# Patient Record
Sex: FEMALE | Race: OTHER | HISPANIC OR LATINO | URBAN - METROPOLITAN AREA
[De-identification: names, ages, dates, MRNs, and addresses within clinical notes are randomized per-mention and may not be internally consistent; named-entity substitution may affect disease eponyms.]

---

## 2017-05-15 ENCOUNTER — INPATIENT (INPATIENT)
Facility: HOSPITAL | Age: 62
LOS: 2 days | Discharge: HOME | DRG: 125 | End: 2017-05-18
Attending: PSYCHIATRY & NEUROLOGY | Admitting: PSYCHIATRY & NEUROLOGY
Payer: SELF-PAY

## 2017-05-15 VITALS
OXYGEN SATURATION: 96 % | DIASTOLIC BLOOD PRESSURE: 77 MMHG | HEART RATE: 71 BPM | WEIGHT: 195.55 LBS | TEMPERATURE: 98 F | RESPIRATION RATE: 18 BRPM | SYSTOLIC BLOOD PRESSURE: 197 MMHG

## 2017-05-15 LAB
ALBUMIN SERPL ELPH-MCNC: 3.6 G/DL — SIGNIFICANT CHANGE UP (ref 3.4–5)
ALP SERPL-CCNC: 103 U/L — SIGNIFICANT CHANGE UP (ref 40–120)
ALT FLD-CCNC: 28 U/L — SIGNIFICANT CHANGE UP (ref 12–42)
ANION GAP SERPL CALC-SCNC: 6 MMOL/L — LOW (ref 9–16)
APTT BLD: 32.4 SEC — SIGNIFICANT CHANGE UP (ref 27.5–37.4)
AST SERPL-CCNC: 46 U/L — HIGH (ref 15–37)
BASOPHILS NFR BLD AUTO: 0.5 % — SIGNIFICANT CHANGE UP (ref 0–2)
BILIRUB SERPL-MCNC: 0.7 MG/DL — SIGNIFICANT CHANGE UP (ref 0.2–1.2)
BUN SERPL-MCNC: 6 MG/DL — LOW (ref 7–23)
CALCIUM SERPL-MCNC: 8.8 MG/DL — SIGNIFICANT CHANGE UP (ref 8.5–10.5)
CHLORIDE SERPL-SCNC: 104 MMOL/L — SIGNIFICANT CHANGE UP (ref 96–108)
CK MB CFR SERPL CALC: <1 NG/ML — SIGNIFICANT CHANGE UP (ref 0.5–3.6)
CK SERPL-CCNC: 114 U/L — SIGNIFICANT CHANGE UP (ref 26–192)
CO2 SERPL-SCNC: 29 MMOL/L — SIGNIFICANT CHANGE UP (ref 22–31)
CREAT SERPL-MCNC: 0.78 MG/DL — SIGNIFICANT CHANGE UP (ref 0.5–1.3)
EOSINOPHIL NFR BLD AUTO: 2.8 % — SIGNIFICANT CHANGE UP (ref 0–6)
GLUCOSE SERPL-MCNC: 119 MG/DL — HIGH (ref 70–99)
HCT VFR BLD CALC: 40.5 % — SIGNIFICANT CHANGE UP (ref 34.5–45)
HGB BLD-MCNC: 13.8 G/DL — SIGNIFICANT CHANGE UP (ref 11.5–15.5)
INR BLD: 1.11 — SIGNIFICANT CHANGE UP (ref 0.88–1.16)
LYMPHOCYTES # BLD AUTO: 29.6 % — SIGNIFICANT CHANGE UP (ref 13–44)
MCHC RBC-ENTMCNC: 29.7 PG — SIGNIFICANT CHANGE UP (ref 27–34)
MCHC RBC-ENTMCNC: 34.1 G/DL — SIGNIFICANT CHANGE UP (ref 32–36)
MCV RBC AUTO: 87.1 FL — SIGNIFICANT CHANGE UP (ref 80–100)
MONOCYTES NFR BLD AUTO: 8.7 % — SIGNIFICANT CHANGE UP (ref 2–14)
NEUTROPHILS NFR BLD AUTO: 58.4 % — SIGNIFICANT CHANGE UP (ref 43–77)
PLATELET # BLD AUTO: 271 K/UL — SIGNIFICANT CHANGE UP (ref 150–400)
POTASSIUM SERPL-MCNC: 3.8 MMOL/L — SIGNIFICANT CHANGE UP (ref 3.5–5.3)
POTASSIUM SERPL-SCNC: 3.8 MMOL/L — SIGNIFICANT CHANGE UP (ref 3.5–5.3)
PROT SERPL-MCNC: 7.9 G/DL — SIGNIFICANT CHANGE UP (ref 6.4–8.2)
PROTHROM AB SERPL-ACNC: 12.3 SEC — SIGNIFICANT CHANGE UP (ref 9.8–12.7)
RBC # BLD: 4.65 M/UL — SIGNIFICANT CHANGE UP (ref 3.8–5.2)
RBC # FLD: 12.7 % — SIGNIFICANT CHANGE UP (ref 10.3–16.9)
SODIUM SERPL-SCNC: 139 MMOL/L — SIGNIFICANT CHANGE UP (ref 135–145)
TROPONIN I SERPL-MCNC: <0.015 NG/ML — SIGNIFICANT CHANGE UP (ref 0.01–0.04)
WBC # BLD: 5.6 K/UL — SIGNIFICANT CHANGE UP (ref 3.8–10.5)
WBC # FLD AUTO: 5.6 K/UL — SIGNIFICANT CHANGE UP (ref 3.8–10.5)

## 2017-05-15 PROCEDURE — 70496 CT ANGIOGRAPHY HEAD: CPT | Mod: 26

## 2017-05-15 PROCEDURE — 99285 EMERGENCY DEPT VISIT HI MDM: CPT | Mod: 25

## 2017-05-15 PROCEDURE — 93010 ELECTROCARDIOGRAM REPORT: CPT

## 2017-05-15 PROCEDURE — 70498 CT ANGIOGRAPHY NECK: CPT | Mod: 26

## 2017-05-15 RX ORDER — HEPARIN SODIUM 5000 [USP'U]/ML
5000 INJECTION INTRAVENOUS; SUBCUTANEOUS EVERY 8 HOURS
Qty: 0 | Refills: 0 | Status: DISCONTINUED | OUTPATIENT
Start: 2017-05-15 | End: 2017-05-18

## 2017-05-15 RX ORDER — ASPIRIN/CALCIUM CARB/MAGNESIUM 324 MG
81 TABLET ORAL DAILY
Qty: 0 | Refills: 0 | Status: DISCONTINUED | OUTPATIENT
Start: 2017-05-16 | End: 2017-05-18

## 2017-05-15 RX ORDER — ASPIRIN/CALCIUM CARB/MAGNESIUM 324 MG
81 TABLET ORAL DAILY
Qty: 0 | Refills: 0 | Status: DISCONTINUED | OUTPATIENT
Start: 2017-05-15 | End: 2017-05-15

## 2017-05-15 RX ORDER — CLOPIDOGREL BISULFATE 75 MG/1
75 TABLET, FILM COATED ORAL DAILY
Qty: 0 | Refills: 0 | Status: DISCONTINUED | OUTPATIENT
Start: 2017-05-15 | End: 2017-05-18

## 2017-05-15 RX ORDER — SODIUM CHLORIDE 9 MG/ML
1000 INJECTION INTRAMUSCULAR; INTRAVENOUS; SUBCUTANEOUS
Qty: 0 | Refills: 0 | Status: DISCONTINUED | OUTPATIENT
Start: 2017-05-15 | End: 2017-05-16

## 2017-05-15 RX ORDER — MECLIZINE HCL 12.5 MG
50 TABLET ORAL EVERY 12 HOURS
Qty: 0 | Refills: 0 | Status: DISCONTINUED | OUTPATIENT
Start: 2017-05-15 | End: 2017-05-17

## 2017-05-15 RX ORDER — ATORVASTATIN CALCIUM 80 MG/1
80 TABLET, FILM COATED ORAL AT BEDTIME
Qty: 0 | Refills: 0 | Status: DISCONTINUED | OUTPATIENT
Start: 2017-05-15 | End: 2017-05-18

## 2017-05-15 RX ORDER — ACETAMINOPHEN 500 MG
650 TABLET ORAL EVERY 6 HOURS
Qty: 0 | Refills: 0 | Status: DISCONTINUED | OUTPATIENT
Start: 2017-05-15 | End: 2017-05-18

## 2017-05-15 RX ORDER — ASPIRIN/CALCIUM CARB/MAGNESIUM 324 MG
81 TABLET ORAL ONCE
Qty: 0 | Refills: 0 | Status: COMPLETED | OUTPATIENT
Start: 2017-05-15 | End: 2017-05-15

## 2017-05-15 RX ORDER — ASPIRIN/CALCIUM CARB/MAGNESIUM 324 MG
81 TABLET ORAL ONCE
Qty: 0 | Refills: 0 | Status: DISCONTINUED | OUTPATIENT
Start: 2017-05-15 | End: 2017-05-15

## 2017-05-15 RX ADMIN — CLOPIDOGREL BISULFATE 75 MILLIGRAM(S): 75 TABLET, FILM COATED ORAL at 18:57

## 2017-05-15 RX ADMIN — ATORVASTATIN CALCIUM 80 MILLIGRAM(S): 80 TABLET, FILM COATED ORAL at 21:43

## 2017-05-15 RX ADMIN — Medication 81 MILLIGRAM(S): at 14:40

## 2017-05-15 RX ADMIN — HEPARIN SODIUM 5000 UNIT(S): 5000 INJECTION INTRAVENOUS; SUBCUTANEOUS at 21:43

## 2017-05-15 NOTE — ED PROVIDER NOTE - OBJECTIVE STATEMENT
62 yo F poorly compliant pre-DM, HTN, dyslipidemia not on medications p/w 3 days of constant dizziness described as imbalance, with intermittent vision changes and hand tingling and headaches.  Denies neck pain, chest pain, sob.

## 2017-05-15 NOTE — ED PROVIDER NOTE - NEUROLOGICAL, MLM
Alert and oriented, CN2-12 intact, nl speech, slightly off balanced gait, nl finger to nose and KYM, 5/5 bl upper and lower strength, nl sensory exam.

## 2017-05-15 NOTE — ED ADULT NURSE NOTE - CHPI ED SYMPTOMS NEG
no numbness/no vomiting/no fever/no nausea/no weakness/no loss of consciousness/no confusion/no change in level of consciousness

## 2017-05-15 NOTE — H&P ADULT - NSHPREVIEWOFSYSTEMS_GEN_ALL_CORE
General - no f/c  Heart - stable dyspnea with exertion (2 blocks), chronic lower extremity swelling, no CP, no orthopnea  Lungs - no cough, no wheezing  GI - no n/v/d/constipation

## 2017-05-15 NOTE — H&P ADULT - ATTENDING COMMENTS
Patient seen with team.   CT from [facility] reviewed last night - patient with left PCA stroke that is chronic and questionable ischemia in the medulla. The right vertebral also appears markedly stenotic. Vertigo is not positional and with h/o stroke and with risk factors agreee with admission and work up including MRI Brain. Work up should include dissection work up.

## 2017-05-15 NOTE — H&P ADULT - HISTORY OF PRESENT ILLNESS
60 yo W, full iADLs no medical follow up h/o bilat DVT s/p IVC filter (4 years ago) presents with dizziness x 3 days. The dizziness feels like the room is spinning. It had an acute onset, waxes and wanes, and was accompanied by blurry vision, a posterior HA and tingling in her hands. No weakness. No n/v, no difficulty speaking.     In ED pt had high 's/90's improving to 140's/60's. CTH showed encephalomalacia changes within the left occipital lobe which may be secondary to prior PCA territory infarct. CTA showed attenuated caliber to the left posterior cerebral artery correlating with the left PCA territory infarct. Given aspirni 81 and admitted to stroke unit. 60 yo W, full iADLs no medical follow up h/o bilat DVT s/p IVC filter (4 years ago) presents with dizziness x 3 days. The dizziness feels like the room is spinning. It had an acute onset, waxes and wanes, and was accompanied by blurry vision, a posterior HA and bilateral tingling in her hands. No weakness. No n/v, no difficulty speaking, no vision loss.      In ED pt had high 's/90's improving to 140's/60's. CTH showed encephalomalacia changes within the left occipital lobe which may be secondary to prior PCA territory infarct. CTA showed attenuated caliber to the left posterior cerebral artery correlating with the left PCA territory infarct. Given aspirni 81 and admitted to stroke unit.

## 2017-05-15 NOTE — ED PROVIDER NOTE - MEDICAL DECISION MAKING DETAILS
Pt with ss noted above.  Concern for cerebellar stroke as cause - noted CT.  Likely related to noncompliance of chronic med conditions.  Plan admit to stroke service for further hugo.  No acute interv indicated.

## 2017-05-15 NOTE — ED ADULT TRIAGE NOTE - CHIEF COMPLAINT QUOTE
c/o dizziness x 3 days accompanies with neck pain. Pt reported having vision changes yesterday described as seeing pitch black to left eye that lasted for 7 minutes. No facial droop or slurring of speech noted. BUE are strong a equal in strength.

## 2017-05-15 NOTE — H&P ADULT - NSHPPHYSICALEXAM_GEN_ALL_CORE
T(F): 98.2, Max: 98.2 (05-15 @ 15:46)  HR: 73 (54 - 73)  BP: 162/78 (142/68 - 197/77)  RR: 16 (16 - 18), SpO2: 97% (95% - 98%) RA    General - NAD, no dysarthria  HEENT - PERRL, EOMI, no nystagmus, MMM  Neck - supple  Heart - RRR, frequent PVCs, no murmurs  Lungs - CTAB  Abdomen - soft, NTND  Extremities - WWP, trace edema  MSK - no joint swelling  Neuro - CN grossly intact and symmetric, 5/5 strength in all extremities, sensation to soft touch intact and symmetric, no dysmetria. T(F): 98.2, Max: 98.2 (05-15 @ 15:46)  HR: 73 (54 - 73)  BP: 162/78 (142/68 - 197/77)  RR: 16 (16 - 18), SpO2: 97% (95% - 98%) RA    General - NAD, no dysarthria  HEENT - PERRL, EOMI, no gross visual field defect, no nystagmus, MMM  Neck - supple  Heart - RRR, frequent PVCs, no murmurs  Lungs - CTAB  Abdomen - soft, NTND  Extremities - WWP, trace edema  MSK - no joint swelling  Neuro - CN grossly intact and symmetric, 5/5 strength in all extremities, sensation to soft touch intact and symmetric, no dysmetria.

## 2017-05-15 NOTE — ED ADULT NURSE NOTE - OBJECTIVE STATEMENT
Pt with hx of MT and IVC filter began having lightheadedness and blurred vision in her L eye 3 days ago. Pt states she has no other symptoms, but she had these symptoms before last time she was admitted to the hosp about 4 yrs ago. Pt has not had follow up care since that time.

## 2017-05-15 NOTE — H&P ADULT - ASSESSMENT
IMPRESSION: 62 yo W, full iADLs no medical follow up h/o bilat DVT s/p IVC filter (4 years ago) presents with dizziness x 3 days. CT findings concerning for stroke.     PLAN  #dizziness - concerning for CVA but imaging does not correlate with symptom type and onset. ordered for MRI brain. also ordered for full stroke etiology w/u - echo bubble, TSH, A1C, lipids, EKG. will treat with aspirin 81 and high dose statin. IVF. permissive HTN < 200/100 as symptoms started within 3 days. Consulted PT, OT.     #High blood pressure - unclear if underlying HTN or 2/2 stroke. will monitor on tele unit.     #Diet - DASH    #PPX - SQH    FULL CODE

## 2017-05-15 NOTE — H&P ADULT - NSHPLABSRESULTS_GEN_ALL_CORE
13.8   5.6   )-----------( 271      ( 15 May 2017 11:05 )             40.5   05-15    139  |  104  |  6<L>  ----------------------------<  119<H>  3.8   |  29  |  0.78    Ca    8.8      15 May 2017 11:05    TPro  7.9  /  Alb  3.6  /  TBili  0.7  /  DBili  x   /  AST  46<H>  /  ALT  28  /  AlkPhos  103  05-15    CARDIAC MARKERS ( 15 May 2017 11:05 )  <0.015 ng/mL / x     / 114 U/L / x     / <1.0 ng/mL

## 2017-05-16 LAB
ANION GAP SERPL CALC-SCNC: 7 MMOL/L — LOW (ref 9–16)
BUN SERPL-MCNC: 7 MG/DL — SIGNIFICANT CHANGE UP (ref 7–23)
CALCIUM SERPL-MCNC: 8.2 MG/DL — LOW (ref 8.5–10.5)
CHLORIDE SERPL-SCNC: 104 MMOL/L — SIGNIFICANT CHANGE UP (ref 96–108)
CHOLEST SERPL-MCNC: 179 MG/DL — SIGNIFICANT CHANGE UP
CO2 SERPL-SCNC: 28 MMOL/L — SIGNIFICANT CHANGE UP (ref 22–31)
CREAT SERPL-MCNC: 0.68 MG/DL — SIGNIFICANT CHANGE UP (ref 0.5–1.3)
GLUCOSE SERPL-MCNC: 110 MG/DL — HIGH (ref 70–99)
HBA1C BLD-MCNC: 6.4 % — HIGH (ref 4.8–5.6)
HCT VFR BLD CALC: 37.6 % — SIGNIFICANT CHANGE UP (ref 34.5–45)
HDLC SERPL-MCNC: 32 MG/DL — LOW
HGB BLD-MCNC: 12.7 G/DL — SIGNIFICANT CHANGE UP (ref 11.5–15.5)
LIPID PNL WITH DIRECT LDL SERPL: 99 MG/DL — SIGNIFICANT CHANGE UP
MAGNESIUM SERPL-MCNC: 2.2 MG/DL — SIGNIFICANT CHANGE UP (ref 1.6–2.6)
MCHC RBC-ENTMCNC: 29.7 PG — SIGNIFICANT CHANGE UP (ref 27–34)
MCHC RBC-ENTMCNC: 33.8 G/DL — SIGNIFICANT CHANGE UP (ref 32–36)
MCV RBC AUTO: 87.9 FL — SIGNIFICANT CHANGE UP (ref 80–100)
PLATELET # BLD AUTO: 252 K/UL — SIGNIFICANT CHANGE UP (ref 150–400)
POTASSIUM SERPL-MCNC: 3.5 MMOL/L — SIGNIFICANT CHANGE UP (ref 3.5–5.3)
POTASSIUM SERPL-SCNC: 3.5 MMOL/L — SIGNIFICANT CHANGE UP (ref 3.5–5.3)
RBC # BLD: 4.28 M/UL — SIGNIFICANT CHANGE UP (ref 3.8–5.2)
RBC # FLD: 12.8 % — SIGNIFICANT CHANGE UP (ref 10.3–16.9)
SODIUM SERPL-SCNC: 139 MMOL/L — SIGNIFICANT CHANGE UP (ref 135–145)
TOTAL CHOLESTEROL/HDL RATIO MEASUREMENT: 5.6 RATIO — HIGH
TRIGL SERPL-MCNC: 238 MG/DL — HIGH
TSH SERPL-MCNC: 1.97 UIU/ML — SIGNIFICANT CHANGE UP (ref 0.35–4.94)
WBC # BLD: 5.7 K/UL — SIGNIFICANT CHANGE UP (ref 3.8–10.5)
WBC # FLD AUTO: 5.7 K/UL — SIGNIFICANT CHANGE UP (ref 3.8–10.5)

## 2017-05-16 PROCEDURE — 93010 ELECTROCARDIOGRAM REPORT: CPT

## 2017-05-16 PROCEDURE — 70551 MRI BRAIN STEM W/O DYE: CPT | Mod: 26

## 2017-05-16 RX ORDER — LISINOPRIL 2.5 MG/1
5 TABLET ORAL DAILY
Qty: 0 | Refills: 0 | Status: DISCONTINUED | OUTPATIENT
Start: 2017-05-16 | End: 2017-05-16

## 2017-05-16 RX ORDER — LISINOPRIL 2.5 MG/1
5 TABLET ORAL DAILY
Qty: 0 | Refills: 0 | Status: DISCONTINUED | OUTPATIENT
Start: 2017-05-16 | End: 2017-05-18

## 2017-05-16 RX ORDER — POTASSIUM CHLORIDE 20 MEQ
40 PACKET (EA) ORAL EVERY 4 HOURS
Qty: 0 | Refills: 0 | Status: COMPLETED | OUTPATIENT
Start: 2017-05-16 | End: 2017-05-16

## 2017-05-16 RX ORDER — INSULIN LISPRO 100/ML
VIAL (ML) SUBCUTANEOUS
Qty: 0 | Refills: 0 | Status: DISCONTINUED | OUTPATIENT
Start: 2017-05-16 | End: 2017-05-18

## 2017-05-16 RX ORDER — POTASSIUM CHLORIDE 20 MEQ
40 PACKET (EA) ORAL
Qty: 0 | Refills: 0 | Status: DISCONTINUED | OUTPATIENT
Start: 2017-05-16 | End: 2017-05-16

## 2017-05-16 RX ORDER — SODIUM CHLORIDE 9 MG/ML
1000 INJECTION INTRAMUSCULAR; INTRAVENOUS; SUBCUTANEOUS
Qty: 0 | Refills: 0 | Status: DISCONTINUED | OUTPATIENT
Start: 2017-05-16 | End: 2017-05-17

## 2017-05-16 RX ADMIN — HEPARIN SODIUM 5000 UNIT(S): 5000 INJECTION INTRAVENOUS; SUBCUTANEOUS at 06:05

## 2017-05-16 RX ADMIN — ATORVASTATIN CALCIUM 80 MILLIGRAM(S): 80 TABLET, FILM COATED ORAL at 21:35

## 2017-05-16 RX ADMIN — Medication 81 MILLIGRAM(S): at 11:54

## 2017-05-16 RX ADMIN — Medication 40 MILLIEQUIVALENT(S): at 15:09

## 2017-05-16 RX ADMIN — Medication 40 MILLIEQUIVALENT(S): at 10:39

## 2017-05-16 RX ADMIN — HEPARIN SODIUM 5000 UNIT(S): 5000 INJECTION INTRAVENOUS; SUBCUTANEOUS at 15:10

## 2017-05-16 RX ADMIN — CLOPIDOGREL BISULFATE 75 MILLIGRAM(S): 75 TABLET, FILM COATED ORAL at 11:54

## 2017-05-16 RX ADMIN — HEPARIN SODIUM 5000 UNIT(S): 5000 INJECTION INTRAVENOUS; SUBCUTANEOUS at 21:35

## 2017-05-16 NOTE — PROGRESS NOTE ADULT - SUBJECTIVE AND OBJECTIVE BOX
INTERVAL HPI/OVERNIGHT EVENTS: BUDDY o/n    VITAL SIGNS:  T(F): 97.6, Max: 98.6 (05-15 @ 18:46)  HR: 53 (53 - 73)  BP: 162/76 (142/68 - 197/77)  BP(mean): 109 (98 - 113)  RR: 16 (16 - 18), SpO2: 96% (95% - 98%)    PHYSICAL EXAM:  Constitutional:  Eyes:  ENMT:  Neck:  Respiratory:  Cardiovascular:  Gastrointestinal:  Extremities:  Vascular:  Neurological:  Musculoskeletal:    LABS:                        12.7   5.7   )-----------( 252      ( 16 May 2017 05:56 )             37.6     05-16    139  |  104  |  7   ----------------------------<  110<H>  3.5   |  28  |  0.68    Ca    8.2<L>      16 May 2017 05:56  Mg     2.2     05-16    RADIOLOGY & ADDITIONAL TESTS: INTERVAL HPI/OVERNIGHT EVENTS: BUDDY o/n  SUBJECTIVE: dizziness improved, no complaints    VITAL SIGNS:  T(F): 97.6, Max: 98.6 (05-15 @ 18:46)  HR: 53 (53 - 73)  BP: 162/76 (142/68 - 197/77)  BP(mean): 109 (98 - 113)  RR: 16 (16 - 18), SpO2: 96% (95% - 98%) RA    PHYSICAL EXAM:  Constitutional: NAD  Eyes: PERRL, EOMI, no nystagmus  ENMT: MMM  Neck: supple  Respiratory: CTAB  Cardiovascular: RRR, no murmurs  Gastrointestinal: obese, soft, NTND  Extremities: WWP, trace edema  Vascular: +radial, DPs  Neurological: A&O x 3, no focal visual field deficits, 5/5 strength, sensation intact and symmetric to soft touch  Musculoskeletal: No joint swelling    LABS:                        12.7   5.7   )-----------( 252      ( 16 May 2017 05:56 )             37.6     05-16    139  |  104  |  7   ----------------------------<  110<H>  3.5   |  28  |  0.68    Ca    8.2<L>      16 May 2017 05:56  Mg     2.2     05-16    RADIOLOGY & ADDITIONAL TESTS: EKG - sinus christiano INTERVAL HPI/OVERNIGHT EVENTS: BUDDY o/n  SUBJECTIVE: dizziness improved. On further hx, patient had sudden reduced Va x 1 wk.     VITAL SIGNS:  T(F): 97.6, Max: 98.6 (05-15 @ 18:46)  HR: 53 (53 - 73)  BP: 162/76 (142/68 - 197/77)  BP(mean): 109 (98 - 113)  RR: 16 (16 - 18), SpO2: 96% (95% - 98%) RA    PHYSICAL EXAM:  Constitutional: NAD  Eyes: PERRL, EOMI, no nystagmus  ENMT: MMM  Neck: supple  Respiratory: CTAB  Cardiovascular: RRR, no murmurs  Gastrointestinal: obese, soft, NTND  Extremities: WWP, trace edema  Vascular: +radial, DPs  Neurological: A&O x 3, OS CF at 1ft, 5/5 strength, sensation intact and symmetric to soft touch  Musculoskeletal: No joint swelling    LABS:                        12.7   5.7   )-----------( 252      ( 16 May 2017 05:56 )             37.6     05-16    139  |  104  |  7   ----------------------------<  110<H>  3.5   |  28  |  0.68    Ca    8.2<L>      16 May 2017 05:56  Mg     2.2     05-16    RADIOLOGY & ADDITIONAL TESTS: EKG - sinus christiano

## 2017-05-16 NOTE — CONSULT NOTE ADULT - CONSULT REASON
61 year old female admitted for evaluation of vertigo for past four days.  Denies medical problems and is on no meds per patient.    Patient notes sudden painless loss of vision OS one week ago without improvement.  No flashing lights, floaters or curtains in vision.  Has not improved.

## 2017-05-16 NOTE — OCCUPATIONAL THERAPY INITIAL EVALUATION ADULT - GENERAL OBSERVATIONS, REHAB EVAL
Left hand dominant. Chart reviewed, patient cleared for OT eval by YOMI Guzman. Received in supine, NAD, +tele, +heplock, denies pain.

## 2017-05-16 NOTE — OCCUPATIONAL THERAPY INITIAL EVALUATION ADULT - MD ORDER
60 yo W, full iADLs no medical follow up h/o bilat DVT s/p IVC filter (4 years ago) presents with dizziness x 3 days. The dizziness feels like the room is spinning. It had an acute onset, waxes and wanes, and was accompanied by blurry vision, a posterior HA and bilateral tingling in her hands.

## 2017-05-16 NOTE — CONSULT NOTE ADULT - SUBJECTIVE AND OBJECTIVE BOX
Vision without correction: Distance  OD Easy Count Fingers across room  OS Count fingers 3 feet temporally    Extraocular muscles:  Full OU without restriction    Pupils:  Round, reactive to light OU  NO Afferent Pupillary Defect    Lids:  Good position OU    Conjunctiva/Sclera:  Quiet OU    Corneas:  Clear OU without edema, opacities    Anterior Chambers:  Deep, quiet OU    Dilated with Tropicamide 1%  (Direct and Indirect Ophthalmoscope)    Lens:    Fundi:      Applanation tensions (tonopen(  OD 19  OS 20 Vision without correction: Distance  OD Easy Count Fingers across room  OS Count fingers 3 feet temporally    Extraocular muscles:  Full OU without restriction    Pupils:  Round, reactive to light OU  NO Afferent Pupillary Defect    Lids:  Good position OU    Conjunctiva/Sclera:  Quiet OU    Corneas:  Clear OU without edema, opacities    Anterior Chambers:  Deep, quiet OU    Dilated with Tropicamide 1%  (Direct and Indirect Ophthalmoscope)    Lens:  Clear OU  (???PSC OS to Direct ophthalmoscopy)    Fundi:  OD Normal disc, macula, vessels  OS Very hazy view        Vitreous hemorrhage        Unable to determine if retina attached      Applanation tensions (tonopen(  OD 19  OS 20

## 2017-05-16 NOTE — PHYSICAL THERAPY INITIAL EVALUATION ADULT - MODALITIES TREATMENT COMMENTS
Visual tracking H test- intact, gaze convergence intact, peripheral vision intact, smile symmetrical, speech fluent.

## 2017-05-16 NOTE — OCCUPATIONAL THERAPY INITIAL EVALUATION ADULT - VISUAL ACUITY
reads clock from 25 feet with right eye, reports impaired distance acuity in left eye, describes ?central scotoma left eye

## 2017-05-16 NOTE — CONSULT NOTE ADULT - ASSESSMENT
Impression:      Plan: Impression:  Painless loss of vision OS X 1 week.  No APD  Vitreous hemorrhage  Rule Out retinal detachment      Plan:  Retinal fellow consult

## 2017-05-16 NOTE — PROGRESS NOTE ADULT - ASSESSMENT
IMPRESSION: 62 yo W, full iADLs no medical follow up h/o bilat DVT s/p IVC filter (4 years ago) presents with dizziness x 3 days. CT findings concerning for stroke.     PLAN  #dizziness - concerning for CVA but imaging does not correlate with symptom type and onset. ordered for MRI brain. also ordered for full stroke etiology w/u - echo bubble, TSH (WNL), A1C (6.4), lipids (99), EKG (sinus christiano). will treat with aspirin 81 and high dose statin. IVF. permissive HTN < 200/100 as symptoms started within 3 days. Consulted PT, OT.     #High blood pressure - unclear if underlying HTN or 2/2 stroke. will monitor on tele unit.     #Diet - DASH    #PPX - SQH    FULL CODE IMPRESSION: 60 yo W, full iADLs no medical follow up h/o bilat DVT s/p IVC filter (4 years ago) presents with dizziness x 3 days.     PLAN  #dizziness - concerning for CVA. Will get MRI brain to confirm. TSH (WNL), A1C (6.4), lipids (99), EKG (sinus christiano). will treat with aspirin 81, plavix and high dose statin. IVF. Will discuss with attending BP goals. Consulted PT, OT.     #CVA - CTH and CTA show evidence of L PCA stenosis and sub/acute chronic infarct in occipital territory. c/w aspirin, plavix, and high dose statin.     #Reduced Va in L eye - sudden onset 1 week ago. Consulted ophtho.     #High blood pressure - will dc IVF. may consider starting lisinopril.     #Diet - DASH    #PPX - SQH    FULL CODE

## 2017-05-16 NOTE — OCCUPATIONAL THERAPY INITIAL EVALUATION ADULT - PERTINENT HX OF CURRENT PROBLEM, REHAB EVAL
CTH showed encephalomalacia changes within the left occipital lobe which may be secondary to prior PCA territory infarct. CTA showed attenuated caliber to the left posterior cerebral artery correlating with the left PCA territory infarct.

## 2017-05-17 DIAGNOSIS — I10 ESSENTIAL (PRIMARY) HYPERTENSION: ICD-10-CM

## 2017-05-17 DIAGNOSIS — R73.03 PREDIABETES: ICD-10-CM

## 2017-05-17 DIAGNOSIS — R63.8 OTHER SYMPTOMS AND SIGNS CONCERNING FOOD AND FLUID INTAKE: ICD-10-CM

## 2017-05-17 DIAGNOSIS — Z29.9 ENCOUNTER FOR PROPHYLACTIC MEASURES, UNSPECIFIED: ICD-10-CM

## 2017-05-17 DIAGNOSIS — I63.9 CEREBRAL INFARCTION, UNSPECIFIED: ICD-10-CM

## 2017-05-17 DIAGNOSIS — H43.12 VITREOUS HEMORRHAGE, LEFT EYE: ICD-10-CM

## 2017-05-17 LAB
ANION GAP SERPL CALC-SCNC: 6 MMOL/L — LOW (ref 9–16)
BUN SERPL-MCNC: 9 MG/DL — SIGNIFICANT CHANGE UP (ref 7–23)
CALCIUM SERPL-MCNC: 8.9 MG/DL — SIGNIFICANT CHANGE UP (ref 8.5–10.5)
CHLORIDE SERPL-SCNC: 104 MMOL/L — SIGNIFICANT CHANGE UP (ref 96–108)
CO2 SERPL-SCNC: 29 MMOL/L — SIGNIFICANT CHANGE UP (ref 22–31)
CREAT SERPL-MCNC: 0.68 MG/DL — SIGNIFICANT CHANGE UP (ref 0.5–1.3)
GLUCOSE SERPL-MCNC: 105 MG/DL — HIGH (ref 70–99)
HCT VFR BLD CALC: 39.7 % — SIGNIFICANT CHANGE UP (ref 34.5–45)
HGB BLD-MCNC: 13.3 G/DL — SIGNIFICANT CHANGE UP (ref 11.5–15.5)
MAGNESIUM SERPL-MCNC: 2.2 MG/DL — SIGNIFICANT CHANGE UP (ref 1.6–2.6)
MCHC RBC-ENTMCNC: 29.4 PG — SIGNIFICANT CHANGE UP (ref 27–34)
MCHC RBC-ENTMCNC: 33.5 G/DL — SIGNIFICANT CHANGE UP (ref 32–36)
MCV RBC AUTO: 87.6 FL — SIGNIFICANT CHANGE UP (ref 80–100)
PLATELET # BLD AUTO: 272 K/UL — SIGNIFICANT CHANGE UP (ref 150–400)
POTASSIUM SERPL-MCNC: 4.2 MMOL/L — SIGNIFICANT CHANGE UP (ref 3.5–5.3)
POTASSIUM SERPL-SCNC: 4.2 MMOL/L — SIGNIFICANT CHANGE UP (ref 3.5–5.3)
RBC # BLD: 4.53 M/UL — SIGNIFICANT CHANGE UP (ref 3.8–5.2)
RBC # FLD: 12.7 % — SIGNIFICANT CHANGE UP (ref 10.3–16.9)
SODIUM SERPL-SCNC: 139 MMOL/L — SIGNIFICANT CHANGE UP (ref 135–145)
WBC # BLD: 6.4 K/UL — SIGNIFICANT CHANGE UP (ref 3.8–10.5)
WBC # FLD AUTO: 6.4 K/UL — SIGNIFICANT CHANGE UP (ref 3.8–10.5)

## 2017-05-17 PROCEDURE — 70544 MR ANGIOGRAPHY HEAD W/O DYE: CPT | Mod: 26

## 2017-05-17 PROCEDURE — 70547 MR ANGIOGRAPHY NECK W/O DYE: CPT | Mod: 26

## 2017-05-17 RX ADMIN — Medication 81 MILLIGRAM(S): at 13:14

## 2017-05-17 RX ADMIN — LISINOPRIL 5 MILLIGRAM(S): 2.5 TABLET ORAL at 05:27

## 2017-05-17 RX ADMIN — CLOPIDOGREL BISULFATE 75 MILLIGRAM(S): 75 TABLET, FILM COATED ORAL at 13:14

## 2017-05-17 RX ADMIN — ATORVASTATIN CALCIUM 80 MILLIGRAM(S): 80 TABLET, FILM COATED ORAL at 22:33

## 2017-05-17 RX ADMIN — HEPARIN SODIUM 5000 UNIT(S): 5000 INJECTION INTRAVENOUS; SUBCUTANEOUS at 05:27

## 2017-05-17 RX ADMIN — HEPARIN SODIUM 5000 UNIT(S): 5000 INJECTION INTRAVENOUS; SUBCUTANEOUS at 22:32

## 2017-05-17 RX ADMIN — HEPARIN SODIUM 5000 UNIT(S): 5000 INJECTION INTRAVENOUS; SUBCUTANEOUS at 13:14

## 2017-05-17 NOTE — PROGRESS NOTE ADULT - PROBLEM SELECTOR PLAN 1
Found to have subacute/chronic infarct in L occipital lobe with evidence of L PCA stenosis on CT head/CTA head. MRI revealed L PCA infarct, R corona radiata infarct extending to the basal ganglia, and evidence of chronic ischemic disease. Found to be pre-diabetic with dyslipidemia; TSH wnl.   -F/u TTE w/ bubble.  -C/w ASA, Plavix, Lipitor 80mg po qd.  -awaiting MRA head/neck w/ dissection protocol

## 2017-05-17 NOTE — PROGRESS NOTE ADULT - ATTENDING COMMENTS
patient seen with team for morning rounds and study results reviewed during pm rounds with Dr. Perez  Patient seen by opthalmology and found to have large vitreous hemorrhage that would explain her loss of vison in the left eye.   MRI still pending to look for dissection and brain stem infarct that might explain her vertigo and the recent left PCA stroke.  Continue Asa at this time since likelihood of recovery of vision in left eye is limited and risk of stroke is high.
patient will be seen by retinal fellow.   MRA head and neck is pending but can be done as out patient  No new stroke on MRI. PCA stroke is old.   Plan to dc today if stable  Imp: TIA  Retinal hemorrhage  Follow up  with me in 2 weeks.  Continue ASA if ok with optho

## 2017-05-17 NOTE — PROGRESS NOTE ADULT - ASSESSMENT
Pt is a 62yo F with PMH of b/l DVT s/p IVC filter (4 years ago) who presented with dizziness x3 days, blurry vision, L-sided HA and b/l tingling in her hands. found to be hypertensive in ED to SBPs 180-190s and CT/CTA head with subacute/chronic infarct in L occipital lobe with evidence of L PCA stenosis; admitted for management of CVA. Pt also w/ vitreous hemorrhage awaiting evaluation.

## 2017-05-17 NOTE — PROGRESS NOTE ADULT - PROBLEM SELECTOR PLAN 2
Seen by general ophthalmologist, unable to r/o retinal detachment.  -patient to be seen by retina specialist tonight, f/u recs

## 2017-05-17 NOTE — PROGRESS NOTE ADULT - SUBJECTIVE AND OBJECTIVE BOX
PGY-1 Transfer Accept Note    Hospital Course: pt is a 62yo F with PMH of b/l DVT s/p IVC filter (4 years ago) who presented with dizziness x3 days, blurry vision, L-sided HA and b/l tingling in her hands. Found to be hypertensive in ED to SBPs 180-190s and CT/CTA head with subacute/chronic infarct in L occipital lobe with evidence of L PCA stenosis. Pt given ASA, Plavix, Lipitor and admitted to [facility] stroke unit. Pt found to be pre-diabetic with dyslipidemia; TSH wnl. MRI showed L PCA infarct, R corona radiata infarct extending to the basal ganglia, and evidence of chronic ischemic disease. Awaiting ECHO w/ bubble. Hypertension resolved on own and pt restarted on home Lisinopril. Opthalmology consulted for blurry vision, found to have posterior vitreal hemorrhage, currently awaiting retinal specialist consult to r/o retinal detachment. Pt stable for transfer to [facility] for continued management.     Subjective: Patient reports unchanged blurry vision OS. No eye pain. Denies headache, chest pain, dyspnea, abdominal pain, weakness, numbness/tingling.    Objective:  VITAL SIGNS:  T(F): 98  HR: 80  BP: 163/77  RR: 16  SpO2: 99%  Wt(kg): --    PHYSICAL EXAM:    General: NAD  Eyes: PERRL, EOMI without pain or diplopia, no nystagmus, no conjunctival hemorrhage/injection  Neck: No JVD, no LAD  Respiratory: CTA b/l, no wheezes/rhonchi/crackles  Cardiovascular: RRR, normal S1/S2, no murmurs  Gastrointestinal: obese, soft, NTND, +BS  Extremities: WWP, no peripheral edema  Vascular: +radial, DPs  Neurological: A&O x 3, PERRL, 5/5 strength, sensation intact and symmetric to light touch      MEDICATIONS  (STANDING):  atorvastatin 80milliGRAM(s) Oral at bedtime  heparin  Injectable 5000Unit(s) SubCutaneous every 8 hours  aspirin enteric coated 81milliGRAM(s) Oral daily  clopidogrel Tablet 75milliGRAM(s) Oral daily  insulin lispro (HumaLOG) corrective regimen sliding scale  SubCutaneous Before meals and at bedtime  lisinopril 5milliGRAM(s) Oral daily    MEDICATIONS  (PRN):  acetaminophen   Tablet. 650milliGRAM(s) Oral every 6 hours PRN Mild Pain (1 - 3)      Allergies    No Known Allergies    Intolerances        LABS:                        13.3   6.4   )-----------( 272      ( 17 May 2017 06:15 )             39.7     05-17    139  |  104  |  9   ----------------------------<  105<H>  4.2   |  29  |  0.68    Ca    8.9      17 May 2017 06:15  Mg     2.2     05-17

## 2017-05-17 NOTE — CONSULT NOTE ADULT - SUBJECTIVE AND OBJECTIVE BOX
Patient is a 61y old  Female who presents with a chief complaint of dizziness x 3 (15 May 2017 15:52)      HPI:  60 yo W, full iADLs no medical follow up h/o bilat DVT s/p IVC filter (4 years ago) presents with dizziness x 3 days. The dizziness feels like the room is spinning. It had an acute onset, waxes and wanes, and was accompanied by blurry vision, a posterior HA and bilateral tingling in her hands. No weakness. No n/v, no difficulty speaking, no vision loss.      In ED pt had high 's/90's improving to 140's/60's. CTH showed encephalomalacia changes within the left occipital lobe which may be secondary to prior PCA territory infarct. CTA showed attenuated caliber to the left posterior cerebral artery correlating with the left PCA territory infarct. Given aspirni 81 and admitted to stroke unit. (15 May 2017 15:52)      PAST MEDICAL & SURGICAL HISTORY:      MEDICATIONS  (STANDING):  atorvastatin 80milliGRAM(s) Oral at bedtime  heparin  Injectable 5000Unit(s) SubCutaneous every 8 hours  aspirin enteric coated 81milliGRAM(s) Oral daily  clopidogrel Tablet 75milliGRAM(s) Oral daily  insulin lispro (HumaLOG) corrective regimen sliding scale  SubCutaneous Before meals and at bedtime  sodium chloride 0.9%. 1000milliLiter(s) IV Continuous <Continuous>  lisinopril 5milliGRAM(s) Oral daily    MEDICATIONS  (PRN):  acetaminophen   Tablet. 650milliGRAM(s) Oral every 6 hours PRN Mild Pain (1 - 3)  meclizine 50milliGRAM(s) Oral every 12 hours PRN Dizziness      Social History: lives with mother in an elevator accessible apartment building, mother has HHA    Functional Level Prior to Admission: ADL independent, walks without assistive devices, works in retail    FAMILY HISTORY:      CBC Full  -  ( 17 May 2017 06:15 )  WBC Count : 6.4 K/uL  Hemoglobin : 13.3 g/dL  Hematocrit : 39.7 %  Platelet Count - Automated : 272 K/uL  Mean Cell Volume : 87.6 fL  Mean Cell Hemoglobin : 29.4 pg  Mean Cell Hemoglobin Concentration : 33.5 g/dL  Auto Neutrophil # : x  Auto Lymphocyte # : x  Auto Monocyte # : x  Auto Eosinophil # : x  Auto Basophil # : x  Auto Neutrophil % : x  Auto Lymphocyte % : x  Auto Monocyte % : x  Auto Eosinophil % : x  Auto Basophil % : x      05-17    139  |  104  |  9   ----------------------------<  105<H>  4.2   |  29  |  0.68    Ca    8.9      17 May 2017 06:15  Mg     2.2     05-17    TPro  7.9  /  Alb  3.6  /  TBili  0.7  /  DBili  x   /  AST  46<H>  /  ALT  28  /  AlkPhos  103  05-15            Radiology:    EXAM:  CT BRAIN                          PROCEDURE DATE:  05/15/2017                     INTERPRETATION:  PROCEDURE: CT brain without intravenous contrast    INDICATION: Dizziness, blurred vision, neck pain    TECHNIQUE: Multiple axial images were obtained at 5 mm intervals from the   skull base to the vertex. Sagittal and coronal reformatted images were   obtained from the axial data set. The images were reviewed in brain and   bone windows.    COMPARISON: None    FINDINGS: The CT examination demonstrates encephalomalacia changes within   the paramedian left occipital lobe which may be secondary to prior   ischemia along the left PCA territory. The ventricles, cisternal spaces,   and cortical sulci are otherwise appropriate for the patient's stated   age. There is no midline shift or extra axial collections. There is no   intracranial hemorrhage or acute transcortical infarct. There is patchy   areas of hypodensity within the periventricular white matter which may   represent the sequela of small vessel ischemic disease. The bony windows   demonstrates no fractures. The visualized paranasal sinuses are within   normal limits. The mastoid air cells are well aerated.    IMPRESSION: Encephalomalacia changes within the left occipital lobe which   may be secondary to prior PCA territory infarct. No intracranial   hemorrhage or acute transcortical infarct.    EXAM:  CT ANGIO BRAIN (W)AW IC                          EXAM:  CT ANGIO NECK (W)AW IC                          PROCEDURE DATE:  05/15/2017     100  Optiray 350   30           INTERPRETATION:  PROCEDURE: CTA brain with and without intravenous   contrast.    INDICATION: Dizziness, blurred vision, neck pain    TECHNIQUE: Multiple thin section axial images were obtained through the   [place] following the intravenous injection of contrast. MPR   sagittal and coronal MIP images were generated from the axial images. 3-D   reconstructions were also obtained and postprocessed on a independent   workstation.    COMPARISON: None     FINDINGS: The CTA examination of the [place] demonstrates the   internal carotid arteries to be normal in caliber. There is a normal   bifurcation into the A1 and M1 segments. The left A1 segment is   hypoplastic. The anterior communicating artery is patent. There is a   normal MCA bifurcation. The right vertebral artery is underdeveloped as a   attenuated intradural segment (V4). The vertebral arteries are otherwise   normal in caliber. The basilar artery is normal in caliber. There is a   normal bifurcation into the right posterior cerebral artery. The left   posterior cerebral artery is markedly attenuated in caliber which   correlates with the left PCA territory infarct.     IMPRESSION: Attenuated caliber to the left posterior cerebral artery   correlating with the left PCA territory infarct.              Vital Signs Last 24 Hrs  T(C): 36.7, Max: 36.8 (05-16 @ 17:00)  T(F): 98, Max: 98.3 (05-16 @ 21:00)  HR: 70 (60 - 74)  BP: 133/63 (133/63 - 176/77)  BP(mean): 90 (90 - 119)  RR: 16 (16 - 16)  SpO2: 97% (96% - 98%)    REVIEW OF SYSTEMS:    CONSTITUTIONAL: No fever, weight loss, or fatigue  EYES: No eye pain, visual disturbances, or discharge  ENMT:  No difficulty hearing, tinnitus, vertigo; No sinus or throat pain  NECK: No pain or stiffness  BREASTS: No pain, masses, or nipple discharge  RESPIRATORY: No cough, wheezing, chills or hemoptysis; No shortness of breath  CARDIOVASCULAR: No chest pain, palpitations, dizziness, or leg swelling  GASTROINTESTINAL: No abdominal or epigastric pain. No nausea, vomiting, or hematemesis; No diarrhea or constipation. No melena or hematochezia.  GENITOURINARY: No dysuria, frequency, hematuria, or incontinence  NEUROLOGICAL: No headaches, memory loss, loss of strength, numbness, or tremors  SKIN: No itching, burning, rashes, or lesions   LYMPH NODES: No enlarged glands  ENDOCRINE: No heat or cold intolerance; No hair loss  MUSCULOSKELETAL: No joint pain or swelling; No muscle, back, or extremity pain  PSYCHIATRIC: No depression, anxiety, mood swings, or difficulty sleeping  HEME/LYMPH: No easy bruising, or bleeding gums  ALLERGY AND IMMUNOLOGIC: No hives or eczema      Physical Exam: WDWN  woman lying in bed, no complaints of dizziness    HEENT: normocephalic/ atraumatic, anicteric    Neck: supple, negative JVD, negative carotid bruits, neg Idania Urrutia Gardiner    Chest: CTA bilaterally, neg wheeze, rhonchi, rales, crackles, egophany    Cardiovascular: regular rate and rhythm, neg murmurs/rubs/gallops    Abdomen: soft, non tender, negative rebound/guarding, non distended, normal bowel sounds, neg hepatosplenomegaly    Extremities: WWP, neg cyanosis/clubbing/edema, negative calf tenderness to palpation, negative Viry's sign    Neurologic Exam:    Alert and oriented to person, place, date/year, speech fluent w/o dysarthria, repetition intact, comprehension intact,     Cranial Nerves:     II:                       pupils equal, round and reactive to light, visual fields intact   III/ IV/VI:             extraocular movements intact, neg nystagmus, ptosis  V:                      facial sensation intact, V1-3 normal  VII:                     face symmetric, no droop, normal eye closure and smile  VIII:                    hearing intact to finger rub bilaterally  IX/ X:                  soft palate rise symmetrical  XI:                      head turning, shoulder shrug normal  XII:                     tongue midline    Motor Exam:    Bilateral UE:        5/5 /intrinsics                            5/5 biceps/triceps/wrist extensors-flexors/deltoid                            negative pronator drift      Bilateral LE:        5/5 hip flexors/adductors/abductors                            5/5 quadriceps/hamstrings                            5/5 dorsiflexors/plantar flexors/invertors-evertors    Sensory: intact to LT/PP in all UE/LE dermatomes    DTR:        biceps/     triceps/     brachioradialis                 patella/   medial hamstring/    ankle                 neg clonus                 neg Babinski                 neg Hoffmans    Finger to Nose: wnl    Heel to Shin:      wnl    Rapid Alternating movements:  wnl    Joint Position Sense:  intact    Romberg: NT    Tandem Walking: NT    Gait:  NT        PM&R Impression:    1) s/p Left PCA distribution infarct      Recommendations:    1) Physical therapy focusing on therapeutic exercises, bed mobility/transfer out of bed evaluation, progressive ambulation with assistive devices.    2) Disposition Plan: d/c home

## 2017-05-18 ENCOUNTER — TRANSCRIPTION ENCOUNTER (OUTPATIENT)
Age: 62
End: 2017-05-18

## 2017-05-18 VITALS
OXYGEN SATURATION: 95 % | HEART RATE: 75 BPM | SYSTOLIC BLOOD PRESSURE: 121 MMHG | RESPIRATION RATE: 16 BRPM | DIASTOLIC BLOOD PRESSURE: 75 MMHG | TEMPERATURE: 98 F

## 2017-05-18 LAB
ANION GAP SERPL CALC-SCNC: 13 MMOL/L — SIGNIFICANT CHANGE UP (ref 5–17)
BUN SERPL-MCNC: 12 MG/DL — SIGNIFICANT CHANGE UP (ref 7–23)
CALCIUM SERPL-MCNC: 9.4 MG/DL — SIGNIFICANT CHANGE UP (ref 8.4–10.5)
CHLORIDE SERPL-SCNC: 102 MMOL/L — SIGNIFICANT CHANGE UP (ref 96–108)
CO2 SERPL-SCNC: 27 MMOL/L — SIGNIFICANT CHANGE UP (ref 22–31)
CREAT SERPL-MCNC: 0.7 MG/DL — SIGNIFICANT CHANGE UP (ref 0.5–1.3)
GLUCOSE SERPL-MCNC: 113 MG/DL — HIGH (ref 70–99)
HCT VFR BLD CALC: 43.4 % — SIGNIFICANT CHANGE UP (ref 34.5–45)
HCYS SERPL-MCNC: 9 UMOL/L — SIGNIFICANT CHANGE UP (ref 5–20)
HGB BLD-MCNC: 14.5 G/DL — SIGNIFICANT CHANGE UP (ref 11.5–15.5)
MAGNESIUM SERPL-MCNC: 2 MG/DL — SIGNIFICANT CHANGE UP (ref 1.6–2.6)
MCHC RBC-ENTMCNC: 29.1 PG — SIGNIFICANT CHANGE UP (ref 27–34)
MCHC RBC-ENTMCNC: 33.4 G/DL — SIGNIFICANT CHANGE UP (ref 32–36)
MCV RBC AUTO: 87.1 FL — SIGNIFICANT CHANGE UP (ref 80–100)
PLATELET # BLD AUTO: 287 K/UL — SIGNIFICANT CHANGE UP (ref 150–400)
POTASSIUM SERPL-MCNC: 4.2 MMOL/L — SIGNIFICANT CHANGE UP (ref 3.5–5.3)
POTASSIUM SERPL-SCNC: 4.2 MMOL/L — SIGNIFICANT CHANGE UP (ref 3.5–5.3)
RBC # BLD: 4.98 M/UL — SIGNIFICANT CHANGE UP (ref 3.8–5.2)
RBC # FLD: 12.6 % — SIGNIFICANT CHANGE UP (ref 10.3–16.9)
SODIUM SERPL-SCNC: 142 MMOL/L — SIGNIFICANT CHANGE UP (ref 135–145)
WBC # BLD: 6.3 K/UL — SIGNIFICANT CHANGE UP (ref 3.8–10.5)
WBC # FLD AUTO: 6.3 K/UL — SIGNIFICANT CHANGE UP (ref 3.8–10.5)

## 2017-05-18 PROCEDURE — 82553 CREATINE MB FRACTION: CPT

## 2017-05-18 PROCEDURE — 93005 ELECTROCARDIOGRAM TRACING: CPT

## 2017-05-18 PROCEDURE — 99238 HOSP IP/OBS DSCHRG MGMT 30/<: CPT

## 2017-05-18 PROCEDURE — 70450 CT HEAD/BRAIN W/O DYE: CPT

## 2017-05-18 PROCEDURE — 80053 COMPREHEN METABOLIC PANEL: CPT

## 2017-05-18 PROCEDURE — 85027 COMPLETE CBC AUTOMATED: CPT

## 2017-05-18 PROCEDURE — 85610 PROTHROMBIN TIME: CPT

## 2017-05-18 PROCEDURE — 36415 COLL VENOUS BLD VENIPUNCTURE: CPT

## 2017-05-18 PROCEDURE — 70498 CT ANGIOGRAPHY NECK: CPT

## 2017-05-18 PROCEDURE — 84484 ASSAY OF TROPONIN QUANT: CPT

## 2017-05-18 PROCEDURE — 70547 MR ANGIOGRAPHY NECK W/O DYE: CPT

## 2017-05-18 PROCEDURE — 82550 ASSAY OF CK (CPK): CPT

## 2017-05-18 PROCEDURE — 84443 ASSAY THYROID STIM HORMONE: CPT

## 2017-05-18 PROCEDURE — 85598 HEXAGNAL PHOSPH PLTLT NEUTRL: CPT

## 2017-05-18 PROCEDURE — 85025 COMPLETE CBC W/AUTO DIFF WBC: CPT

## 2017-05-18 PROCEDURE — 70551 MRI BRAIN STEM W/O DYE: CPT

## 2017-05-18 PROCEDURE — 93306 TTE W/DOPPLER COMPLETE: CPT

## 2017-05-18 PROCEDURE — 85300 ANTITHROMBIN III ACTIVITY: CPT

## 2017-05-18 PROCEDURE — 83036 HEMOGLOBIN GLYCOSYLATED A1C: CPT

## 2017-05-18 PROCEDURE — 86147 CARDIOLIPIN ANTIBODY EA IG: CPT

## 2017-05-18 PROCEDURE — 80061 LIPID PANEL: CPT

## 2017-05-18 PROCEDURE — 80048 BASIC METABOLIC PNL TOTAL CA: CPT

## 2017-05-18 PROCEDURE — 99285 EMERGENCY DEPT VISIT HI MDM: CPT | Mod: 25

## 2017-05-18 PROCEDURE — 70496 CT ANGIOGRAPHY HEAD: CPT

## 2017-05-18 PROCEDURE — 83735 ASSAY OF MAGNESIUM: CPT

## 2017-05-18 PROCEDURE — 97161 PT EVAL LOW COMPLEX 20 MIN: CPT

## 2017-05-18 PROCEDURE — 83090 ASSAY OF HOMOCYSTEINE: CPT

## 2017-05-18 PROCEDURE — 85306 CLOT INHIBIT PROT S FREE: CPT

## 2017-05-18 PROCEDURE — 85307 ASSAY ACTIVATED PROTEIN C: CPT

## 2017-05-18 PROCEDURE — 70544 MR ANGIOGRAPHY HEAD W/O DYE: CPT

## 2017-05-18 PROCEDURE — 85730 THROMBOPLASTIN TIME PARTIAL: CPT

## 2017-05-18 PROCEDURE — 85301 ANTITHROMBIN III ANTIGEN: CPT

## 2017-05-18 PROCEDURE — 85303 CLOT INHIBIT PROT C ACTIVITY: CPT

## 2017-05-18 PROCEDURE — 86146 BETA-2 GLYCOPROTEIN ANTIBODY: CPT

## 2017-05-18 RX ORDER — ATORVASTATIN CALCIUM 80 MG/1
1 TABLET, FILM COATED ORAL
Qty: 0 | Refills: 0 | COMMUNITY
Start: 2017-05-18

## 2017-05-18 RX ORDER — CLOPIDOGREL BISULFATE 75 MG/1
1 TABLET, FILM COATED ORAL
Qty: 0 | Refills: 0 | COMMUNITY
Start: 2017-05-18

## 2017-05-18 RX ORDER — ATORVASTATIN CALCIUM 80 MG/1
1 TABLET, FILM COATED ORAL
Qty: 30 | Refills: 0 | OUTPATIENT
Start: 2017-05-18 | End: 2017-06-17

## 2017-05-18 RX ORDER — LACTULOSE 10 G/15ML
200 SOLUTION ORAL DAILY
Qty: 0 | Refills: 0 | Status: DISCONTINUED | OUTPATIENT
Start: 2017-05-18 | End: 2017-05-18

## 2017-05-18 RX ORDER — ASPIRIN/CALCIUM CARB/MAGNESIUM 324 MG
1 TABLET ORAL
Qty: 30 | Refills: 0 | OUTPATIENT
Start: 2017-05-18 | End: 2017-06-17

## 2017-05-18 RX ORDER — LISINOPRIL 2.5 MG/1
1 TABLET ORAL
Qty: 30 | Refills: 0 | OUTPATIENT
Start: 2017-05-18 | End: 2017-06-17

## 2017-05-18 RX ORDER — ASPIRIN/CALCIUM CARB/MAGNESIUM 324 MG
1 TABLET ORAL
Qty: 0 | Refills: 0 | COMMUNITY
Start: 2017-05-18

## 2017-05-18 RX ORDER — CLOPIDOGREL BISULFATE 75 MG/1
1 TABLET, FILM COATED ORAL
Qty: 30 | Refills: 0 | OUTPATIENT
Start: 2017-05-18 | End: 2017-06-17

## 2017-05-18 RX ORDER — BENZOCAINE AND MENTHOL 5; 1 G/100ML; G/100ML
1 LIQUID ORAL EVERY 4 HOURS
Qty: 0 | Refills: 0 | Status: DISCONTINUED | OUTPATIENT
Start: 2017-05-18 | End: 2017-05-18

## 2017-05-18 RX ORDER — LISINOPRIL 2.5 MG/1
1 TABLET ORAL
Qty: 0 | Refills: 0 | COMMUNITY
Start: 2017-05-18

## 2017-05-18 RX ADMIN — LISINOPRIL 5 MILLIGRAM(S): 2.5 TABLET ORAL at 05:39

## 2017-05-18 RX ADMIN — HEPARIN SODIUM 5000 UNIT(S): 5000 INJECTION INTRAVENOUS; SUBCUTANEOUS at 05:39

## 2017-05-18 RX ADMIN — HEPARIN SODIUM 5000 UNIT(S): 5000 INJECTION INTRAVENOUS; SUBCUTANEOUS at 14:02

## 2017-05-18 RX ADMIN — CLOPIDOGREL BISULFATE 75 MILLIGRAM(S): 75 TABLET, FILM COATED ORAL at 14:03

## 2017-05-18 RX ADMIN — Medication 81 MILLIGRAM(S): at 14:03

## 2017-05-18 NOTE — PROGRESS NOTE ADULT - SUBJECTIVE AND OBJECTIVE BOX
Physical Medicine and Rehabilitation Progress Note:    Patient is a 61y old  Female who presents with a chief complaint of dizziness x 3 (18 May 2017 11:35)      HPI:  60 yo W, full iADLs no medical follow up h/o bilat DVT s/p IVC filter (4 years ago) presents with dizziness x 3 days. The dizziness feels like the room is spinning. It had an acute onset, waxes and wanes, and was accompanied by blurry vision, a posterior HA and bilateral tingling in her hands. No weakness. No n/v, no difficulty speaking, no vision loss.      In ED pt had high 's/90's improving to 140's/60's. CTH showed encephalomalacia changes within the left occipital lobe which may be secondary to prior PCA territory infarct. CTA showed attenuated caliber to the left posterior cerebral artery correlating with the left PCA territory infarct. Given aspirni 81 and admitted to stroke unit. (15 May 2017 15:52)                            14.5   6.3   )-----------( 287      ( 18 May 2017 08:12 )             43.4       05-18    142  |  102  |  12  ----------------------------<  113<H>  4.2   |  27  |  0.70    Ca    9.4      18 May 2017 08:12  Mg     2.0     05-18      Vital Signs Last 24 Hrs  T(C): 36.7, Max: 36.9 (05-17 @ 16:58)  T(F): 98, Max: 98.4 (05-17 @ 16:58)  HR: 75 (55 - 75)  BP: 121/75 (121/75 - 162/97)  BP(mean): --  RR: 16 (14 - 18)  SpO2: 95% (94% - 97%)    MEDICATIONS  (STANDING):  atorvastatin 80milliGRAM(s) Oral at bedtime  heparin  Injectable 5000Unit(s) SubCutaneous every 8 hours  aspirin enteric coated 81milliGRAM(s) Oral daily  clopidogrel Tablet 75milliGRAM(s) Oral daily  insulin lispro (HumaLOG) corrective regimen sliding scale  SubCutaneous Before meals and at bedtime  lisinopril 5milliGRAM(s) Oral daily    MEDICATIONS  (PRN):  acetaminophen   Tablet. 650milliGRAM(s) Oral every 6 hours PRN Mild Pain (1 - 3)    Currently Undergoing Physical Therapy at bedside.    Initial Functional Status Assessment:    Previous Level of Function:   · Ambulation Skills	independent	  · Transfer Skills	independent	  · ADL Skills	independent	  · Work/Leisure Activity	independent	  · Additional Comments	Pt. reports 5 stays to enter, denies prior use of assistive device.	    Cognitive Status Examination:   · Orientation	oriented to person, place, time and situation	  · Level of Consciousness	alert	  · Follows Commands and Answers Questions	100% of the time	  · Personal Safety and Judgment	intact	    Range of Motion Exam:   · Range of Motion Examination	bilateral upper extremity ROM was WFL (within functional limits); bilateral lower extremity ROM was WFL (within functional limits)	    MMT Shoulder:   · Shoulder Flexion	(5) normal, left, (5) normal, right	  · Shoulder Extension	(5) normal, left, (5) normal, right	  · Shoulder ABduction	(5) normal, left, (5) normal, right	    MMT Elbow:   · Elbow Flexion	(5) normal, left, (5) normal, right	  · Elbow Extension	(5) normal, left, (5) normal, right	    MMT Wrist:   · Wrist Flexion	(5) normal, left, (5) normal, right	  · Wrist Extension	(5) normal, left, (5) normal, right	    MMT Hip:   · Hip Flexion	(5) normal, left, (5) normal, right	    MMT Knee:   · Knee Flexion	(5) normal, left, (5) normal, right	  · Knee Extension	(5) normal, left, (5) normal, right	    MMT Ankle:   · Ankle Dorsiflexion	(5) normal, left, (5) normal, right	  · Ankle Plantarflexion	(5) normal, left, (5) normal, right	    Bed Mobility: Rolling/Turning:   · Level of [place]	independent	    Bed Mobility: Scooting/Bridging:   · Level of [place]	independent	    Bed Mobility: Sit to Supine:   · Level of [place]	independent	    Bed Mobility: Supine to Sit:   · Level of [place]	independent	    Transfer: Sit to Stand:   · Level of [place]	independent	  · Weight-Bearing Restrictions	full weight-bearing	    Transfer: Stand to Sit:   · Level of [place]	independent	  · Weight-Bearing Restrictions	full weight-bearing	    Gait Skills:   · Level of [place]	independent	  · Weight-Bearing Restrictions	full weight-bearing	  · Gait Distance	200 feet	    Gait Analysis:   · Gait Deviations Noted	decreased estefani; otherwise steady reciprocal gait.	  Stair Negotiation:   · Level of [place]	independent	  · Weight-Bearing Restrictions	full weight-bearing	  · Assistive Device	right rail up; right rail down	  · Stair Pattern	step over step	  · Number of Stairs	12	    Balance Skills Assessment:   · Sitting Balance: Static	good balance	  · Sitting Balance: Dynamic	good balance	  · Sit-to-Stand Balance	good balance	  · Standing Balance: Static	good balance	  · Standing Balance: Dynamic	good balance	    Sensory Examination:   Sensory Examination:  Light Touch Sensation:   · Left UE	within normal limits, with + localization throughout	  · Right UE	within normal limits	  · Left LE	within normal limits	  · Right LE	within normal limits	  · Head/Neck	within normal limits	    · Coordination Assessed	finger to nose; WNL bilat	    Proprioception:   · Coordination Assessed	finger to nose; WNL bilat	  Treatment Location:   · Comments	Visual tracking H test- intact, gaze convergence intact, peripheral vision intact, smile symmetrical, speech fluent.	    Clinical Impressions:   · Criteria for Skilled Therapeutic Interventions	no new focal  strength, coordination, sensory deficits found	  · Predicted Duration of Therapy Intervention (days/wks)	Pt. remains functionally independent and does not require further PT follow up at this time.	    PM&R Impression: as above    Disposition Plan Recommendations: d/c home

## 2017-05-18 NOTE — DISCHARGE NOTE ADULT - HOSPITAL COURSE
60yo F with PMH of b/l DVT s/p IVC filter (4 years ago) who presented with dizziness x3 days, blurry vision, L-sided HA and b/l tingling in her hands. Found to be hypertensive in ED to SBPs 180-190s and CT/CTA head with subacute/chronic infarct in L occipital lobe with evidence of L PCA stenosis. Pt given ASA, Plavix, Lipitor and admitted to [facility] stroke unit. Pt found to be pre-diabetic with dyslipidemia; TSH wnl. MRI showed L PCA infarct, R corona radiata infarct extending to the basal ganglia, and evidence of chronic ischemic disease. Awaiting ECHO w/ bubble. Hypertension resolved on own and pt restarted on home Lisinopril. Opthalmology consulted for blurry vision, found to have posterior vitreal hemorrhage, currently awaiting retinal specialist consult to r/o retinal detachment. Pt stable for transfer to [facility] for continued management. 60yo F with PMH of b/l DVT s/p IVC filter (4 years ago) who presented with dizziness x3 days, blurry vision, L-sided HA and b/l tingling in her hands. Found to be hypertensive in ED to SBPs 180-190s and CT/CTA head with subacute/chronic infarct in L occipital lobe with evidence of L PCA stenosis. Pt given ASA, Plavix, Lipitor and admitted to [facility] stroke unit. Pt found to be pre-diabetic with dyslipidemia; TSH wnl. MRI showed L PCA infarct, R corona radiata infarct extending to the basal ganglia, and evidence of chronic ischemic disease. Awaiting ECHO w/ bubble. Hypertension resolved on own and pt restarted on home Lisinopril. Opthalmology consulted for blurry vision, found to have posterior vitreal hemorrhage, Dr. Newman suggested outpatient f/u within a month for re-evaluation to see if any form of repair would be needed. Pt stable for discharge at this time, to continue lisinopril, aspirin, plavix, and statin. Pt to follow up with M, Dr. Newman, and Dr. Diana upon d/c. 60yo F with PMH of b/l DVT s/p IVC filter (4 years ago) who presented with dizziness x3 days, blurry vision, L-sided HA and b/l tingling in her hands. Found to be hypertensive in ED to SBPs 180-190s and CT/CTA head with chronic ischemic infarct in L occipital lobe with evidence of L PCA stenosis. Pt given ASA, Plavix, Lipitor and admitted to [facility] stroke unit. Pt found to be pre-diabetic with dyslipidemia; TSH wnl. MRI showed chronic L PCA infarct, chronic R corona radiata infarct extending to the basal ganglia, and evidence of chronic ischemic disease. No new infarcts or hemorrhage. Further imaging of with MRA head/neck revealed no dissection. Hypertension resolved on own and pt restarted on home Lisinopril. Opthalmology consulted for blurry vision, found to have posterior vitreal hemorrhage, Dr. Newman suggested outpatient f/u within a month for re-evaluation to see if any form of repair would be needed. Hypercoagulable labs were sent during your stay given history of b/l DVTs in the past. Will follow up with Dr. Dinaa as an outpatient to review results. Pt stable for discharge at this time, to continue lisinopril, aspirin, plavix, and statin. Pt to follow up with [facility], Dr. Newman, and Dr. Diana upon discharge. 62yo F with PMH of b/l DVT s/p IVC filter (4 years ago) who presented with dizziness x3 days, blurry vision, L-sided HA and b/l tingling in her hands. Found to be hypertensive in ED to SBPs 180-190s and CT/CTA head with chronic ischemic infarct in L occipital lobe with evidence of L PCA stenosis. Pt given ASA, Plavix, Lipitor and admitted to [facility] stroke unit. Pt found to be pre-diabetic with dyslipidemia; TSH wnl. MRI showed chronic L PCA infarct, chronic R corona radiata infarct extending to the basal ganglia, and evidence of chronic ischemic disease. No new infarcts or hemorrhage. Further imaging of with MRA head/neck revealed no dissection. Hypertension resolved on own and pt restarted on home Lisinopril. Opthalmology consulted for blurry vision, found to have posterior vitreal hemorrhage. As such, even though pt initially admitted for stroke work up, symptoms were attributed to posterior vitreal hemorrhage that was found. Dr. Newman suggested outpatient f/u within a month for re-evaluation to see if any form of repair would be needed. Hypercoagulable labs were sent during your stay given history of b/l DVTs in the past. Will follow up with Dr. Diana as an outpatient to review results. Pt stable for discharge at this time, to continue lisinopril, aspirin, plavix, and statin. Was not started on AC as per neuro and will f/u on need for AC as outpatient based on hypercoagulable studies. Appointments made at [facility], Dr. Newman, and with Dr. Diana upon discharge. NIHSS 2, MRS 0 on discharge day

## 2017-05-18 NOTE — DISCHARGE NOTE ADULT - PROVIDER TOKENS
TOKEN:'[id number]',FREE:[LAST:[[facility]],PHONE:[(   )    -],FAX:[(   )    -],ADDRESS:[phone]  Fax [phone]  [address] [place] [address]]] TOKEN:'[id number]',FREE:[LAST:[[facility]],PHONE:[(   )    -],FAX:[(   )    -],ADDRESS:[phone]  Fax [phone]  [address] [place] [address]]],TOKEN:'[id number]'

## 2017-05-18 NOTE — PROGRESS NOTE ADULT - SUBJECTIVE AND OBJECTIVE BOX
Stroke Neurology Follow-Up Visit    Transferred to [facility] yesterday. No acute events overnight.   Pt seen and examined.   Unchanged left eye blurriness.   Otherwise without acute complaints at this time.   Denies headaches, dizziness, nausea, vomiting, slurred speech, difficulty word finding, weakness, or numbness.     Exam:  T(F): 97.3, Max: 98.4 (05-17 @ 16:58)  HR: 55 (55 - 80)  BP: 139/60 (124/79 - 163/77)  RR: 14 (14 - 18)  SpO2: 94% (94% - 99%)    Gen: Lying in bed, calm, cooperative, pleasant, in NAD  Neuro:  Mental status: Awake, alert and oriented x3. Follows all commands. Recent and remote memory intact.  Naming, repetition and comprehension intact.  Attention/concentration intact.  No dysarthria, no aphasia.      Cranial nerves: Pupils equally round and reactive to light, 3 mm. L eye with decreased vision, blurriness. R eye VFF. no nystagmus, extraocular muscles intact, V1 through V3 intact bilaterally and symmetric, no facial droop, hearing intact to finger rub, palate elevation symmetric, tongue was midline, sternocleidomastoid/shoulder shrug strength bilaterally 5/5.    Motor:  No pronator drift. Normal bulk and tone, strength 5/5 in bilateral upper and lower extremities.   strength strong bilaterally.    Sensation: Intact and symmetric to light touch.  No neglect.   Coordination: No dysmetria on finger-to-nose.  No clumsiness. Rapid alternating movements intact and symmetric.   Reflexes: absent Babinski bilaterally    MEDICATIONS  (STANDING):  atorvastatin 80milliGRAM(s) Oral at bedtime  heparin  Injectable 5000Unit(s) SubCutaneous every 8 hours  aspirin enteric coated 81milliGRAM(s) Oral daily  clopidogrel Tablet 75milliGRAM(s) Oral daily  insulin lispro (HumaLOG) corrective regimen sliding scale  SubCutaneous Before meals and at bedtime  lisinopril 5milliGRAM(s) Oral daily    MEDICATIONS  (PRN):  acetaminophen   Tablet. 650milliGRAM(s) Oral every 6 hours PRN Mild Pain (1 - 3)      Labs:                        14.5   6.3   )-----------( 287      ( 18 May 2017 08:12 )             43.4     05-18    142  |  102  |  12  ----------------------------<  113<H>  4.2   |  27  |  0.70    Ca    9.4      18 May 2017 08:12  Mg     2.0     05-18    Thyroid Stimulating Hormone, Serum: 1.973 uIU/mL  Hemoglobin A1C, Whole Blood: 6.4 %    Cholesterol, Serum: 179 mg/dL  HDL Cholesterol, Serum: 32 mg/dL  LDL 99  Triglycerides, Serum: 238 mg/dL  Hemoglobin A1C, Whole Blood: 6.3 %    Radiology:  5/15 CT head:   IMPRESSION: Encephalomalacia changes within the left occipital lobe which may be secondary to prior PCA territory infarct. No intracranial hemorrhage or acute transcortical infarct.    5/15 CTA head/neck:   IMPRESSION: Attenuated caliber to the left posterior cerebral artery correlating with the left PCA territory infarct.  IMPRESSION: No significant carotid stenosis.    5/17 ECHO: There is mild concentric left ventricular hypertrophy. The left ventricular wall motion is normal. The left ventricular ejection fraction is estimated to be 55-60%The left atrial size is normal.The left atrial volume index is 25 cc/m2 (normal <34cc/m2)Injection of agitated saline contrast documented no   interatrial shunt.Right atrial size is normal.The right ventricle is normal in size and function.No evidence for any hemodynamically significant valvular disease.No aortic root dilatation.The inferior vena cava is normal in size (<2.1 cm) with normal inspiratory collapse (>50%) consistent with normal right atrial pressure.  There is no pericardial effusion.    5/16 MRI head:   IMPRESSION:  No hydrocephalus, midline shift, acute parenchymal hemorrhage or infarction.  Chronic left PCA territory infarct. Chronic infarction involving the right corona radiata extending to the right basal ganglia. Chronic white matter microangiopathic ischemic disease.    5/17 MRA head:   IMPRESSION:   No acute high-grade stenotic or occlusive lesion.  Poor flow-related enhancement and caliber of the left posterior cerebral artery as described above, and unchanged from this CTA study.    5/17 MRA neck:   IMPRESSION:   No evidence of arterial dissection.  Mild arthrosclerotic narrowing involving the origin of the left internal carotid artery    Assessment & Plan:  61 year old female with PMH of b/l DVT s/p IVC filter (4 years ago) presents with dizziness x3 days, blurry vision, L-sided HA and b/l tingling in her hands. Initially admitted for stroke work up, was found to have posterior vitreal hemorrhage.     -MRI head without evidence of acute infarct. Chronic infarcts present in left PCA territory, chronic lacunar infarcts within right corona radiata and right basal ganglia.   -CTA head without high grade occlusion or stenosis.   -CTA neck and MRA head showing attenuated caliber of left PCA, likely contributing to chronic left PCA infarct; no evidence of dissection  -ECHO with mild LVH, EF 55-60%, no PFO   -hypercoagulable studies sent today given pt with history of bilateral DVTs, will follow up results as an outpatient   -continue aspirin and plavix for atherosclerotic disease in the meantime  -continue atorvastatin 80 mg, LDL goal < 70  -SBP goal < 140, currently well controlled with lisinopril   -DVT prophylaxis with HSQ   -PT/OT - no needs, safe for discharge home  -plan for discharge home today, waiting for daughter to pick her up  -NIHSS 2, MRS 0 on discharge day   -follow up with Dr. Diana (neurology) on 6/2/17 at 12:30 pm Stroke Neurology Follow-Up Visit    Transferred to [facility] yesterday. No acute events overnight.   Pt seen and examined.   Unchanged left eye blurriness.   Otherwise without acute complaints at this time.   Denies headaches, dizziness, nausea, vomiting, slurred speech, difficulty word finding, weakness, or numbness.     Exam:  T(F): 97.3, Max: 98.4 (05-17 @ 16:58)  HR: 55 (55 - 80)  BP: 139/60 (124/79 - 163/77)  RR: 14 (14 - 18)  SpO2: 94% (94% - 99%)    Gen: Lying in bed, calm, cooperative, pleasant, in NAD  Neuro:  Mental status: Awake, alert and oriented x3. Follows all commands. Recent and remote memory intact.  Naming, repetition and comprehension intact.  Attention/concentration intact.  No dysarthria, no aphasia.      Cranial nerves: Pupils equally round and reactive to light, 3 mm. L eye with decreased vision, blurriness. R eye VFF. no nystagmus, extraocular muscles intact, V1 through V3 intact bilaterally and symmetric, no facial droop, hearing intact to finger rub, palate elevation symmetric, tongue was midline, sternocleidomastoid/shoulder shrug strength bilaterally 5/5.    Motor:  No pronator drift. Normal bulk and tone, strength 5/5 in bilateral upper and lower extremities.   strength strong bilaterally.    Sensation: Intact and symmetric to light touch.  No neglect.   Coordination: No dysmetria on finger-to-nose.  No clumsiness. Rapid alternating movements intact and symmetric.   Reflexes: absent Babinski bilaterally    MEDICATIONS  (STANDING):  atorvastatin 80milliGRAM(s) Oral at bedtime  heparin  Injectable 5000Unit(s) SubCutaneous every 8 hours  aspirin enteric coated 81milliGRAM(s) Oral daily  clopidogrel Tablet 75milliGRAM(s) Oral daily  insulin lispro (HumaLOG) corrective regimen sliding scale  SubCutaneous Before meals and at bedtime  lisinopril 5milliGRAM(s) Oral daily    MEDICATIONS  (PRN):  acetaminophen   Tablet. 650milliGRAM(s) Oral every 6 hours PRN Mild Pain (1 - 3)    Labs:                        14.5   6.3   )-----------( 287      ( 18 May 2017 08:12 )             43.4     05-18    142  |  102  |  12  ----------------------------<  113<H>  4.2   |  27  |  0.70    Ca    9.4      18 May 2017 08:12  Mg     2.0     05-18    Thyroid Stimulating Hormone, Serum: 1.973 uIU/mL  Hemoglobin A1C, Whole Blood: 6.4 %    Cholesterol, Serum: 179 mg/dL  HDL Cholesterol, Serum: 32 mg/dL  LDL 99  Triglycerides, Serum: 238 mg/dL  Hemoglobin A1C, Whole Blood: 6.3 %    Radiology:  5/15 CT head:   IMPRESSION: Encephalomalacia changes within the left occipital lobe which may be secondary to prior PCA territory infarct. No intracranial hemorrhage or acute transcortical infarct.    5/15 CTA head/neck:   IMPRESSION: Attenuated caliber to the left posterior cerebral artery correlating with the left PCA territory infarct.  IMPRESSION: No significant carotid stenosis.    5/17 ECHO: There is mild concentric left ventricular hypertrophy. The left ventricular wall motion is normal. The left ventricular ejection fraction is estimated to be 55-60%The left atrial size is normal.The left atrial volume index is 25 cc/m2 (normal <34cc/m2)Injection of agitated saline contrast documented no   interatrial shunt.Right atrial size is normal.The right ventricle is normal in size and function.No evidence for any hemodynamically significant valvular disease.No aortic root dilatation.The inferior vena cava is normal in size (<2.1 cm) with normal inspiratory collapse (>50%) consistent with normal right atrial pressure.  There is no pericardial effusion.    5/16 MRI head:   IMPRESSION:  No hydrocephalus, midline shift, acute parenchymal hemorrhage or infarction.  Chronic left PCA territory infarct. Chronic infarction involving the right corona radiata extending to the right basal ganglia. Chronic white matter microangiopathic ischemic disease.    5/17 MRA head:   IMPRESSION:   No acute high-grade stenotic or occlusive lesion.  Poor flow-related enhancement and caliber of the left posterior cerebral artery as described above, and unchanged from this CTA study.    5/17 MRA neck:   IMPRESSION:   No evidence of arterial dissection.  Mild arthrosclerotic narrowing involving the origin of the left internal carotid artery    Assessment & Plan:  61 year old female with PMH of b/l DVT s/p IVC filter (4 years ago) presents with dizziness x3 days, blurry vision, L-sided HA and b/l tingling in her hands. Initially admitted for stroke work up but symptoms attributed to posterior vitreal hemorrhage that was found.     - Patient to follow up with Ophthalmology / Retina specialist as outpatient for evaluation for retinal detachment.  As per Dr. Newman, no acute intervention or change in her plan as inpatient.  - Patient cleared for discharge.    Hospital workup for history of chronic infarcts -   -MRI head without evidence of acute infarct. Chronic infarcts present in left PCA territory, chronic lacunar infarcts within right corona radiata and right basal ganglia.   -CTA head without high grade occlusion or stenosis.   -CTA neck and MRA head showing attenuated caliber of left PCA, likely contributing to chronic left PCA infarct; no evidence of dissection  -ECHO with mild LVH, EF 55-60%, no PFO   -hypercoagulable studies sent today given pt with history of bilateral DVTs, will follow up results as an outpatient   -continue aspirin and plavix for atherosclerotic disease in the meantime.  No acute or subacute infarct at this time.    -continue atorvastatin 80 mg, LDL goal < 70  -SBP goal < 140, currently well controlled with lisinopril   -DVT prophylaxis with HSQ   -PT/OT - no needs, safe for discharge home  -NIHSS 2, MRS 0 on discharge day   -follow up with Dr. Diana (neurology) on 6/2/17 at 12:30 pm

## 2017-05-18 NOTE — DISCHARGE NOTE ADULT - CARE PLAN
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 You were continued on aspirin and lisinopril, and started on plavix, atorvastatin. Please continue these medications at home. Follow up with Dr. 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 You were continued on aspirin and lisinopril, and started on plavix, atorvastatin. Please continue these medications at home. Follow up with Dr. Diana on 6/2 at 12:30  Secondary Diagnosis:	Vitreous hemorrhage, left  Instructions for follow-up, activity and diet:	You were seen by opthalmology during your stay here, there is no acute intervention needed for this vitreous hemorrhage at this time.  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 You were continued on aspirin and lisinopril, and started on plavix, atorvastatin. Please continue these medications at home. Follow up with Dr. Diana on 6/2 at 12:30  Secondary Diagnosis:	Vitreous hemorrhage, left  Instructions for follow-up, activity and diet:	You were seen by opthalmology during your stay here, there is no acute intervention needed for this vitreous hemorrhage at this time. Follow up with opthalmology (Dr. Newman) within one month for re-evaluation for possible repair or vitrectomy if needed  Secondary Diagnosis:	Hypertension  Instructions for follow-up, activity and diet:	Continue with home lisinopril  Secondary Diagnosis:	Prediabetes  Instructions for follow-up, activity and diet:	Your HgbA1c was found to be 6.4%, please follow up with your PCP for further management. Continue lifestyle and diet modifications. Principal Discharge DX:	Cerebellar infarction with occlusion or stenosis of cerebellar artery  Goal:	prevent reoccurrence  Instructions for follow-up, activity and diet:	You were found to have chronic ischemic infarct in left occipital lobe. MRI of your head was performed and there were no new strokes. Imaging of the vessels in your head and neck revealed narrowing of the left posterior cerebral artery, likely from atherosclerosis, which contributed to your old stroke. You were continued on aspirin and lisinopril, and started on plavix for better antiplatelet coverage as well as atorvastatin. Please continue these medications at home. Follow up with Dr. Diana on 6/2 at 12:30  Secondary Diagnosis:	Vitreous hemorrhage, left  Instructions for follow-up, activity and diet:	You were seen by opthalmology during your stay here, there is no acute intervention needed for this vitreous hemorrhage at this time. Follow up with opthalmology (Dr. Newman) within one month for re-evaluation for possible repair or vitrectomy if needed  Secondary Diagnosis:	Hypertension  Instructions for follow-up, activity and diet:	Continue with home lisinopril  Secondary Diagnosis:	Prediabetes  Instructions for follow-up, activity and diet:	Your HgbA1c was found to be 6.4%, please follow up with your PCP for further management. Continue lifestyle and diet modifications. Principal Discharge DX:	Vitreous hemorrhage, left  Goal:	resolve symptoms  Instructions for follow-up, activity and diet:	You were seen by opthalmology during your stay here, there is no acute intervention needed for this vitreous hemorrhage at this time. Follow up with opthalmology (Dr. Newman) within one month for re-evaluation for possible repair or vitrectomy if needed  Secondary Diagnosis:	Hypertension  Instructions for follow-up, activity and diet:	continue with home lisinopril  Secondary Diagnosis:	Prediabetes  Instructions for follow-up, activity and diet:	Your HgbA1c was found to be 6.4%, please follow up with your PCP for further management. Continue lifestyle and diet modifications.  Secondary Diagnosis:	History of CVA (cerebrovascular accident)  Instructions for follow-up, activity and diet:	You were found to have chronic ischemic infarct in left occipital lobe. MRI of your head was performed and there were no new strokes or evidence of dissection. Imaging of the vessels in your head and neck revealed narrowing of the left posterior cerebral artery, likely from atherosclerosis, which contributed to your old stroke. You were continued on aspirin and lisinopril, and started on plavix for better antiplatelet coverage as well as atorvastatin. Please continue these medications at home. Follow up with Dr. Diana on 6/2 at 12:30

## 2017-05-18 NOTE — DISCHARGE NOTE ADULT - CARE PROVIDERS DIRECT ADDRESSES
,[email or web address]_Unknown [email or web address],DirectAddress_Unknown,DirectAddress_Unknown

## 2017-05-18 NOTE — DISCHARGE NOTE ADULT - CARE PROVIDER_API CALL
William Diana), Neurology; Vascular Neurology  [address] [address]  Phone: [phone]  Fax: [phone]    [facility],   [phone]  Fax [phone]  [address] [place] [address]  Phone: (   )    -  Fax: (   )    - William Diana), Neurology; Vascular Neurology  [address] [address]  Phone: [phone]  Fax: [phone]    [facility],   [phone]  Fax [phone]  [address] [address]  Phone: (   )    -  Fax: (   )    -    Dar Newman), Ophthalmology  [address] [address]  Phone: [phone]  Fax: [phone]

## 2017-05-18 NOTE — DISCHARGE NOTE ADULT - MEDICATION SUMMARY - MEDICATIONS TO TAKE
I will START or STAY ON the medications listed below when I get home from the hospital:    aspirin 81 mg oral delayed release tablet  -- 1 tab(s) by mouth once a day  -- Indication: For stroke    lisinopril 5 mg oral tablet  -- 1 tab(s) by mouth once a day  -- Indication: For Htn    atorvastatin 80 mg oral tablet  -- 1 tab(s) by mouth once a day (at bedtime)  -- Indication: For stroke    clopidogrel 75 mg oral tablet  -- 1 tab(s) by mouth once a day  -- Indication: For stroke

## 2017-05-18 NOTE — DISCHARGE NOTE ADULT - ADDITIONAL INSTRUCTIONS
Follow up with Dr. Diana on 6/2 at 12:30 Follow up with Dr. Diana on 6/2 at 12:30  Follow up with Dr. Newman on 6/5 at 1:30pm  [facility] will call you for an appointment

## 2017-05-18 NOTE — DISCHARGE NOTE ADULT - PLAN OF CARE
prevent reoccurrence You were found to have subacute/chronic infarct in left occipital lobe. You were continued on aspirin and lisinopril, and started on plavix, atorvastatin. Please continue these medications at home. Follow up with Dr. Diana on 6/2 at 12:30 You were seen by opthalmology during your stay here, there is no acute intervention needed for vitreous hemorrhage You were seen by opthalmology during your stay here, there is no acute intervention needed for this vitreous hemorrhage at this time. Continue with home lisinopril Your HgbA1c was found to be 6.4%, please follow up with your PCP for further management. Continue lifestyle and diet modifications. You were seen by opthalmology during your stay here, there is no acute intervention needed for this vitreous hemorrhage at this time. Follow up with opthalmology (Dr. Newman) within one month for re-evaluation for possible repair or vitrectomy if needed You were found to have chronic ischemic infarct in left occipital lobe. MRI of your head was performed and there were no new strokes. Imaging of the vessels in your head and neck revealed narrowing of the left posterior cerebral artery, likely from atherosclerosis, which contributed to your old stroke. You were continued on aspirin and lisinopril, and started on plavix for better antiplatelet coverage as well as atorvastatin. Please continue these medications at home. Follow up with Dr. Diana on 6/2 at 12:30 resolve symptoms You were found to have chronic ischemic infarct in left occipital lobe. MRI of your head was performed and there were no new strokes or evidence of dissection. Imaging of the vessels in your head and neck revealed narrowing of the left posterior cerebral artery, likely from atherosclerosis, which contributed to your old stroke. You were continued on aspirin and lisinopril, and started on plavix for better antiplatelet coverage as well as atorvastatin. Please continue these medications at home. Follow up with Dr. Diana on 6/2 at 12:30 continue with home lisinopril

## 2017-05-18 NOTE — DISCHARGE NOTE ADULT - PATIENT PORTAL LINK FT
“You can access the [facility] Patient Portal, offered by [facility], by registering with the following website: [email or web address]”

## 2017-05-18 NOTE — PROGRESS NOTE ADULT - ASSESSMENT
61 year old female with PMH of b/l DVT s/p IVC filter (4 years ago) presents with dizziness x3 days, blurry vision, L-sided HA and b/l tingling in her hands. Initially admitted for stroke work up, was found to have posterior vitreal hemorrhage.     -MRI head without evidence of acute infarct. Chronic infarcts present in left PCA territory, chronic lacunar infarcts within right corona radiata and right basal ganglia.   -CTA head without high grade occlusion or stenosis.   -CTA neck and MRA head showing attenuated caliber of left PCA, likely contributing to chronic left PCA infarct; no evidence of dissection  -ECHO with mild LVH, EF 55-60%, no PFO   -hypercoagulable studies sent today given pt with history of bilateral DVTs, will follow up results as an outpatient   -continue aspirin and plavix for atherosclerotic disease in the meantime  -continue atorvastatin 80 mg, LDL goal < 70  -SBP goal < 140, currently well controlled with lisinopril

## 2017-05-19 LAB
APCR PPP: 2.92 RATIO — SIGNIFICANT CHANGE UP
AT III ACT/NOR PPP CHRO: 70 % — LOW (ref 85–135)
B2 GLYCOPROT1 AB SER QL: NEGATIVE — SIGNIFICANT CHANGE UP
CARDIOLIPIN AB SER-ACNC: POSITIVE
DRVVT SCREEN TO CONFIRM RATIO: SIGNIFICANT CHANGE UP
LA NT DPL PPP QL: 24.5 SEC — SIGNIFICANT CHANGE UP
PROT C ACT/NOR PPP: 107 % — SIGNIFICANT CHANGE UP (ref 74–150)

## 2017-05-21 DIAGNOSIS — I10 ESSENTIAL (PRIMARY) HYPERTENSION: ICD-10-CM

## 2017-05-21 DIAGNOSIS — Z86.718 PERSONAL HISTORY OF OTHER VENOUS THROMBOSIS AND EMBOLISM: ICD-10-CM

## 2017-05-21 DIAGNOSIS — H35.61 RETINAL HEMORRHAGE, RIGHT EYE: ICD-10-CM

## 2017-05-21 DIAGNOSIS — R73.03 PREDIABETES: ICD-10-CM

## 2017-05-21 DIAGNOSIS — I63.9 CEREBRAL INFARCTION, UNSPECIFIED: ICD-10-CM

## 2017-05-21 DIAGNOSIS — E78.5 HYPERLIPIDEMIA, UNSPECIFIED: ICD-10-CM

## 2017-05-21 DIAGNOSIS — I65.01 OCCLUSION AND STENOSIS OF RIGHT VERTEBRAL ARTERY: ICD-10-CM

## 2017-05-21 DIAGNOSIS — H43.12 VITREOUS HEMORRHAGE, LEFT EYE: ICD-10-CM

## 2017-06-02 ENCOUNTER — APPOINTMENT (OUTPATIENT)
Dept: NEUROLOGY | Facility: CLINIC | Age: 62
End: 2017-06-02

## 2017-06-05 ENCOUNTER — APPOINTMENT (OUTPATIENT)
Dept: INTERNAL MEDICINE | Facility: CLINIC | Age: 62
End: 2017-06-05

## 2022-01-21 NOTE — CONSULT NOTE ADULT - PROVIDER SPECIALTY LIST ADULT
ACE removed. CMS intact.        Rehab Medicine